# Patient Record
Sex: FEMALE | Race: WHITE | NOT HISPANIC OR LATINO
[De-identification: names, ages, dates, MRNs, and addresses within clinical notes are randomized per-mention and may not be internally consistent; named-entity substitution may affect disease eponyms.]

---

## 2022-02-10 ENCOUNTER — RX ONLY (RX ONLY)
Age: 57
End: 2022-02-10

## 2022-02-10 ENCOUNTER — LESION/TUMOR (OUTPATIENT)
Dept: URBAN - METROPOLITAN AREA CLINIC 99 | Facility: CLINIC | Age: 57
Setting detail: DERMATOLOGY
End: 2022-02-10

## 2022-02-10 DIAGNOSIS — L81.4 OTHER MELANIN HYPERPIGMENTATION: ICD-10-CM

## 2022-02-10 DIAGNOSIS — L81.1 CHLOASMA: ICD-10-CM

## 2022-02-10 DIAGNOSIS — L82.1 OTHER SEBORRHEIC KERATOSIS: ICD-10-CM

## 2022-02-10 PROBLEM — B35.1 TINEA UNGUIUM: Status: RESOLVED | Noted: 2022-02-10

## 2022-02-10 PROBLEM — L57.0 ACTINIC KERATOSIS: Status: RESOLVED | Noted: 2022-02-10

## 2022-02-10 PROCEDURE — 11102 TANGNTL BX SKIN SINGLE LES: CPT

## 2022-02-10 PROCEDURE — 99214 OFFICE O/P EST MOD 30 MIN: CPT

## 2022-02-10 PROCEDURE — 11103 TANGNTL BX SKIN EA SEP/ADDL: CPT

## 2022-02-10 PROCEDURE — 17000 DESTRUCT PREMALG LESION: CPT

## 2022-02-10 RX ORDER — VALACYCLOVIR HYDROCHLORIDE 500 MG/1
1 TABLET TABLET, FILM COATED ORAL ONCE A DAY
Qty: 30 | Refills: 11
Start: 2022-02-10

## 2022-02-10 RX ORDER — TERBINAFINE HYDROCHLORIDE 250 MG/1
1 TABLET TABLET ORAL ONCE A DAY
Qty: 30 | Refills: 0
Start: 2022-02-10

## 2022-03-23 ENCOUNTER — CRYOTHERAPY (OUTPATIENT)
Dept: URBAN - METROPOLITAN AREA CLINIC 99 | Facility: CLINIC | Age: 57
Setting detail: DERMATOLOGY
End: 2022-03-23

## 2022-03-23 DIAGNOSIS — C44.729 SQUAMOUS CELL CARCINOMA OF SKIN OF LEFT LOWER LIMB, INCLUDING HIP: ICD-10-CM

## 2022-03-23 DIAGNOSIS — C44.722 SQUAMOUS CELL CARCINOMA OF SKIN OF RIGHT LOWER LIMB, INCLUDING HIP: ICD-10-CM

## 2022-03-23 PROBLEM — L57.0 ACTINIC KERATOSIS: Status: RESOLVED | Noted: 2022-03-23

## 2022-03-23 PROCEDURE — 17003 DESTRUCT PREMALG LES 2-14: CPT

## 2022-03-23 PROCEDURE — 17000 DESTRUCT PREMALG LESION: CPT

## 2022-06-30 ENCOUNTER — APPOINTMENT (OUTPATIENT)
Dept: URBAN - METROPOLITAN AREA SURGERY 14 | Age: 57
Setting detail: DERMATOLOGY
End: 2022-06-30

## 2022-06-30 DIAGNOSIS — B00.1 HERPESVIRAL VESICULAR DERMATITIS: ICD-10-CM

## 2022-06-30 DIAGNOSIS — L82.0 INFLAMED SEBORRHEIC KERATOSIS: ICD-10-CM

## 2022-06-30 DIAGNOSIS — L57.0 ACTINIC KERATOSIS: ICD-10-CM

## 2022-06-30 PROCEDURE — 17110 DESTRUCT B9 LESION 1-14: CPT

## 2022-06-30 PROCEDURE — OTHER LIQUID NITROGEN: OTHER

## 2022-06-30 PROCEDURE — OTHER COUNSELING: OTHER

## 2022-06-30 PROCEDURE — OTHER PRESCRIPTION: OTHER

## 2022-06-30 PROCEDURE — 17000 DESTRUCT PREMALG LESION: CPT | Mod: 59

## 2022-06-30 PROCEDURE — 17003 DESTRUCT PREMALG LES 2-14: CPT | Mod: 59

## 2022-06-30 PROCEDURE — 99214 OFFICE O/P EST MOD 30 MIN: CPT | Mod: 25

## 2022-06-30 PROCEDURE — 99386 PREV VISIT NEW AGE 40-64: CPT

## 2022-06-30 RX ORDER — VALACYCLOVIR HYDROCHLORIDE 500 MG/1
TABLET, FILM COATED ORAL
Qty: 21 | Refills: 2 | Status: ERX | COMMUNITY
Start: 2022-06-30

## 2022-06-30 ASSESSMENT — LOCATION SIMPLE DESCRIPTION DERM
LOCATION SIMPLE: LEFT SCALP
LOCATION SIMPLE: RIGHT HAND
LOCATION SIMPLE: LEFT LIP
LOCATION SIMPLE: LEFT FOREARM

## 2022-06-30 ASSESSMENT — LOCATION DETAILED DESCRIPTION DERM
LOCATION DETAILED: LEFT MEDIAL FRONTAL SCALP
LOCATION DETAILED: RIGHT RADIAL DORSAL HAND
LOCATION DETAILED: LEFT INFERIOR VERMILION LIP
LOCATION DETAILED: 2ND WEB SPACE RIGHT HAND
LOCATION DETAILED: LEFT PROXIMAL DORSAL FOREARM
LOCATION DETAILED: RIGHT ULNAR DORSAL HAND

## 2022-06-30 ASSESSMENT — LOCATION ZONE DERM
LOCATION ZONE: SCALP
LOCATION ZONE: HAND
LOCATION ZONE: LIP
LOCATION ZONE: ARM

## 2022-06-30 NOTE — HPI: SKIN LESION
What Type Of Note Output Would You Prefer (Optional)?: Bullet Format
How Severe Is Your Skin Lesion?: mild
Has Your Skin Lesion Been Treated?: not been treated
Is This A New Presentation, Or A Follow-Up?: Skin Lesions
Additional History: Pt wants refill of Valtrex for cold sores

## 2022-06-30 NOTE — PROCEDURE: LIQUID NITROGEN
Show Applicator Variable?: Yes
Application Tool (Optional): Liquid Nitrogen Sprayer
Post-Care Instructions: I reviewed with the patient in detail post-care instructions. Patient is to wear sunprotection, and avoid picking at any of the treated lesions. Pt may apply Vaseline to crusted or scabbing areas.
Render Note In Bullet Format When Appropriate: No
Duration Of Freeze Thaw-Cycle (Seconds): 4
Detail Level: Detailed
Number Of Freeze-Thaw Cycles: 1 freeze-thaw cycle
Consent: The patient's consent was obtained including but not limited to risks of crusting, scabbing, blistering, scarring, darker or lighter pigmentary change, recurrence, incomplete removal and infection.
Spray Paint Text: The liquid nitrogen was applied to the skin utilizing a spray paint frosting technique.
Medical Necessity Information: It is in your best interest to select a reason for this procedure from the list below. All of these items fulfill various CMS LCD requirements except the new and changing color options.
Medical Necessity Clause: This procedure was medically necessary because the lesions that were treated were: